# Patient Record
Sex: FEMALE
[De-identification: names, ages, dates, MRNs, and addresses within clinical notes are randomized per-mention and may not be internally consistent; named-entity substitution may affect disease eponyms.]

---

## 2017-10-26 ENCOUNTER — APPOINTMENT (OUTPATIENT)
Dept: PULMONOLOGY | Facility: CLINIC | Age: 47
End: 2017-10-26
Payer: COMMERCIAL

## 2017-10-26 VITALS
SYSTOLIC BLOOD PRESSURE: 112 MMHG | HEART RATE: 107 BPM | HEIGHT: 69 IN | DIASTOLIC BLOOD PRESSURE: 78 MMHG | TEMPERATURE: 98.4 F | WEIGHT: 168 LBS | BODY MASS INDEX: 24.88 KG/M2 | OXYGEN SATURATION: 98 %

## 2017-10-26 DIAGNOSIS — D18.03 HEMANGIOMA OF INTRA-ABDOMINAL STRUCTURES: ICD-10-CM

## 2017-10-26 DIAGNOSIS — R93.8 ABNORMAL FINDINGS ON DIAGNOSTIC IMAGING OF OTHER SPECIFIED BODY STRUCTURES: ICD-10-CM

## 2017-10-26 DIAGNOSIS — K90.0 CELIAC DISEASE: ICD-10-CM

## 2017-10-26 DIAGNOSIS — R91.8 OTHER NONSPECIFIC ABNORMAL FINDING OF LUNG FIELD: ICD-10-CM

## 2017-10-26 DIAGNOSIS — Z87.01 PERSONAL HISTORY OF PNEUMONIA (RECURRENT): ICD-10-CM

## 2017-10-26 PROCEDURE — 99244 OFF/OP CNSLTJ NEW/EST MOD 40: CPT | Mod: GC

## 2017-10-26 RX ORDER — NORTRIPTYLINE HYDROCHLORIDE 10 MG/1
10 CAPSULE ORAL 3 TIMES DAILY
Refills: 0 | Status: ACTIVE | COMMUNITY
Start: 2017-10-26

## 2017-10-26 RX ORDER — LORAZEPAM 0.5 MG/1
0.5 TABLET ORAL
Refills: 0 | Status: ACTIVE | COMMUNITY
Start: 2017-10-26

## 2017-10-26 RX ORDER — CYANOCOBALAMIN 1000 UG/ML
1000 INJECTION INTRAMUSCULAR; SUBCUTANEOUS
Refills: 0 | Status: ACTIVE | COMMUNITY
Start: 2017-10-26

## 2017-10-26 RX ORDER — AMOXICILLIN AND CLAVULANATE POTASSIUM 875; 125 MG/1; MG/1
875-125 TABLET, COATED ORAL
Qty: 20 | Refills: 0 | Status: ACTIVE | COMMUNITY
Start: 2017-10-26

## 2017-11-27 ENCOUNTER — APPOINTMENT (OUTPATIENT)
Dept: PULMONOLOGY | Facility: CLINIC | Age: 47
End: 2017-11-27
Payer: COMMERCIAL

## 2017-11-27 VITALS
BODY MASS INDEX: 24.88 KG/M2 | DIASTOLIC BLOOD PRESSURE: 78 MMHG | OXYGEN SATURATION: 99 % | HEART RATE: 101 BPM | HEIGHT: 69 IN | WEIGHT: 168 LBS | TEMPERATURE: 98.1 F | SYSTOLIC BLOOD PRESSURE: 120 MMHG

## 2017-11-27 DIAGNOSIS — J18.9 PNEUMONIA, UNSPECIFIED ORGANISM: ICD-10-CM

## 2017-11-27 PROCEDURE — 90686 IIV4 VACC NO PRSV 0.5 ML IM: CPT

## 2017-11-27 PROCEDURE — 94010 BREATHING CAPACITY TEST: CPT

## 2017-11-27 PROCEDURE — 99213 OFFICE O/P EST LOW 20 MIN: CPT | Mod: 25,GC

## 2017-11-27 PROCEDURE — G0008: CPT

## 2022-09-28 ENCOUNTER — APPOINTMENT (OUTPATIENT)
Dept: ORTHOPEDIC SURGERY | Facility: CLINIC | Age: 52
End: 2022-09-28

## 2022-09-28 VITALS — RESPIRATION RATE: 16 BRPM | BODY MASS INDEX: 24.88 KG/M2 | WEIGHT: 168 LBS | HEIGHT: 69 IN

## 2022-09-28 DIAGNOSIS — Z87.898 PERSONAL HISTORY OF OTHER SPECIFIED CONDITIONS: ICD-10-CM

## 2022-09-28 PROCEDURE — 73140 X-RAY EXAM OF FINGER(S): CPT | Mod: F2

## 2022-09-28 PROCEDURE — 99204 OFFICE O/P NEW MOD 45 MIN: CPT

## 2022-09-28 NOTE — HISTORY OF PRESENT ILLNESS
[Right] : right hand dominant [FreeTextEntry1] : DOI: 9/20/2022.\par \par 8 days s/p left long finger jamming injury causing a drooping of the DIP joint.  Patient has placed a DIP mallet the extension splint for protection patient has used the splint full-time without any interruption.  She has had a x-ray on 9/22/2022 showing no fracture.  Patient is a general medicine practitioner. \par \par Patient began treatment 3 hours after injury, 8 days ago.

## 2022-09-28 NOTE — ASSESSMENT
[FreeTextEntry1] : Patient has a soft tissue mallet finger.  Recommend custom molded mallet splints x2.  Surveillance in 2 weeks and I will see back in 6 weeks as she has been properly wearing it for 1 week

## 2022-09-28 NOTE — PHYSICAL EXAM
[de-identified] : Patient wearing stack splint properly fitting.  Stiff PIP joint.  Skin intact [de-identified] : PA and lateral x-ray do not show any osseous abnormality

## 2022-10-19 ENCOUNTER — NON-APPOINTMENT (OUTPATIENT)
Age: 52
End: 2022-10-19

## 2022-11-09 ENCOUNTER — APPOINTMENT (OUTPATIENT)
Dept: ORTHOPEDIC SURGERY | Facility: CLINIC | Age: 52
End: 2022-11-09

## 2022-11-09 VITALS — RESPIRATION RATE: 16 BRPM | WEIGHT: 168 LBS | HEIGHT: 69 IN | BODY MASS INDEX: 24.88 KG/M2

## 2022-11-09 DIAGNOSIS — M20.012 MALLET FINGER OF LEFT FINGER(S): ICD-10-CM

## 2022-11-09 PROCEDURE — 99213 OFFICE O/P EST LOW 20 MIN: CPT

## 2022-12-07 ENCOUNTER — APPOINTMENT (OUTPATIENT)
Dept: ORTHOPEDIC SURGERY | Facility: CLINIC | Age: 52
End: 2022-12-07

## 2023-01-10 ENCOUNTER — APPOINTMENT (OUTPATIENT)
Dept: ORTHOPEDIC SURGERY | Facility: CLINIC | Age: 53
End: 2023-01-10

## 2024-04-04 ENCOUNTER — NON-APPOINTMENT (OUTPATIENT)
Age: 54
End: 2024-04-04